# Patient Record
Sex: MALE | Race: WHITE | NOT HISPANIC OR LATINO | URBAN - METROPOLITAN AREA
[De-identification: names, ages, dates, MRNs, and addresses within clinical notes are randomized per-mention and may not be internally consistent; named-entity substitution may affect disease eponyms.]

---

## 2021-10-17 ENCOUNTER — EMERGENCY (EMERGENCY)
Facility: HOSPITAL | Age: 56
LOS: 1 days | Discharge: HOME | End: 2021-10-17
Attending: EMERGENCY MEDICINE | Admitting: EMERGENCY MEDICINE
Payer: COMMERCIAL

## 2021-10-17 VITALS
WEIGHT: 240.08 LBS | TEMPERATURE: 98 F | RESPIRATION RATE: 18 BRPM | DIASTOLIC BLOOD PRESSURE: 70 MMHG | HEART RATE: 69 BPM | SYSTOLIC BLOOD PRESSURE: 104 MMHG | OXYGEN SATURATION: 96 % | HEIGHT: 73 IN

## 2021-10-17 VITALS
TEMPERATURE: 98 F | SYSTOLIC BLOOD PRESSURE: 118 MMHG | RESPIRATION RATE: 18 BRPM | OXYGEN SATURATION: 96 % | DIASTOLIC BLOOD PRESSURE: 64 MMHG | HEART RATE: 77 BPM

## 2021-10-17 DIAGNOSIS — S60.811A ABRASION OF RIGHT WRIST, INITIAL ENCOUNTER: ICD-10-CM

## 2021-10-17 DIAGNOSIS — S60.812A ABRASION OF LEFT WRIST, INITIAL ENCOUNTER: ICD-10-CM

## 2021-10-17 DIAGNOSIS — S00.81XA ABRASION OF OTHER PART OF HEAD, INITIAL ENCOUNTER: ICD-10-CM
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
## 2021-10-17 DIAGNOSIS — S80.811A ABRASION, RIGHT LOWER LEG, INITIAL ENCOUNTER: ICD-10-CM
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
## 2021-10-17 DIAGNOSIS — S70.02XA CONTUSION OF LEFT HIP, INITIAL ENCOUNTER: ICD-10-CM

## 2021-10-17 DIAGNOSIS — Z23 ENCOUNTER FOR IMMUNIZATION: ICD-10-CM

## 2021-10-17 DIAGNOSIS — M25.552 PAIN IN LEFT HIP: ICD-10-CM

## 2021-10-17 DIAGNOSIS — R07.89 OTHER CHEST PAIN: ICD-10-CM

## 2021-10-17 LAB
ALBUMIN SERPL ELPH-MCNC: 4.6 G/DL — SIGNIFICANT CHANGE UP (ref 3.3–5)
ALP SERPL-CCNC: 58 U/L — SIGNIFICANT CHANGE UP (ref 40–120)
ALT FLD-CCNC: 44 U/L — SIGNIFICANT CHANGE UP (ref 10–45)
ANION GAP SERPL CALC-SCNC: 12 MMOL/L — SIGNIFICANT CHANGE UP (ref 5–17)
APTT BLD: 27.6 SEC — SIGNIFICANT CHANGE UP (ref 27.5–35.5)
AST SERPL-CCNC: 24 U/L — SIGNIFICANT CHANGE UP (ref 10–40)
BASOPHILS # BLD AUTO: 0.04 K/UL — SIGNIFICANT CHANGE UP (ref 0–0.2)
BASOPHILS NFR BLD AUTO: 0.3 % — SIGNIFICANT CHANGE UP (ref 0–2)
BILIRUB SERPL-MCNC: 0.3 MG/DL — SIGNIFICANT CHANGE UP (ref 0.2–1.2)
BUN SERPL-MCNC: 16 MG/DL — SIGNIFICANT CHANGE UP (ref 7–23)
CALCIUM SERPL-MCNC: 9.6 MG/DL — SIGNIFICANT CHANGE UP (ref 8.4–10.5)
CHLORIDE SERPL-SCNC: 105 MMOL/L — SIGNIFICANT CHANGE UP (ref 96–108)
CO2 SERPL-SCNC: 21 MMOL/L — LOW (ref 22–31)
CREAT SERPL-MCNC: 0.9 MG/DL — SIGNIFICANT CHANGE UP (ref 0.5–1.3)
EOSINOPHIL # BLD AUTO: 0.11 K/UL — SIGNIFICANT CHANGE UP (ref 0–0.5)
EOSINOPHIL NFR BLD AUTO: 0.8 % — SIGNIFICANT CHANGE UP (ref 0–6)
GLUCOSE SERPL-MCNC: 91 MG/DL — SIGNIFICANT CHANGE UP (ref 70–99)
HCT VFR BLD CALC: 42.6 % — SIGNIFICANT CHANGE UP (ref 39–50)
HGB BLD-MCNC: 14.9 G/DL — SIGNIFICANT CHANGE UP (ref 13–17)
IMM GRANULOCYTES NFR BLD AUTO: 0.3 % — SIGNIFICANT CHANGE UP (ref 0–1.5)
INR BLD: 1.09 — SIGNIFICANT CHANGE UP (ref 0.88–1.16)
LIDOCAIN IGE QN: 37 U/L — SIGNIFICANT CHANGE UP (ref 7–60)
LYMPHOCYTES # BLD AUTO: 14.8 % — SIGNIFICANT CHANGE UP (ref 13–44)
LYMPHOCYTES # BLD AUTO: 2.13 K/UL — SIGNIFICANT CHANGE UP (ref 1–3.3)
MCHC RBC-ENTMCNC: 32.2 PG — SIGNIFICANT CHANGE UP (ref 27–34)
MCHC RBC-ENTMCNC: 35 GM/DL — SIGNIFICANT CHANGE UP (ref 32–36)
MCV RBC AUTO: 92 FL — SIGNIFICANT CHANGE UP (ref 80–100)
MONOCYTES # BLD AUTO: 1.13 K/UL — HIGH (ref 0–0.9)
MONOCYTES NFR BLD AUTO: 7.8 % — SIGNIFICANT CHANGE UP (ref 2–14)
NEUTROPHILS # BLD AUTO: 10.98 K/UL — HIGH (ref 1.8–7.4)
NEUTROPHILS NFR BLD AUTO: 76 % — SIGNIFICANT CHANGE UP (ref 43–77)
NRBC # BLD: 0 /100 WBCS — SIGNIFICANT CHANGE UP (ref 0–0)
PLATELET # BLD AUTO: 318 K/UL — SIGNIFICANT CHANGE UP (ref 150–400)
POTASSIUM SERPL-MCNC: 3.9 MMOL/L — SIGNIFICANT CHANGE UP (ref 3.5–5.3)
POTASSIUM SERPL-SCNC: 3.9 MMOL/L — SIGNIFICANT CHANGE UP (ref 3.5–5.3)
PROT SERPL-MCNC: 7.5 G/DL — SIGNIFICANT CHANGE UP (ref 6–8.3)
PROTHROM AB SERPL-ACNC: 13 SEC — SIGNIFICANT CHANGE UP (ref 10.6–13.6)
RBC # BLD: 4.63 M/UL — SIGNIFICANT CHANGE UP (ref 4.2–5.8)
RBC # FLD: 12.7 % — SIGNIFICANT CHANGE UP (ref 10.3–14.5)
SODIUM SERPL-SCNC: 138 MMOL/L — SIGNIFICANT CHANGE UP (ref 135–145)
WBC # BLD: 14.44 K/UL — HIGH (ref 3.8–10.5)
WBC # FLD AUTO: 14.44 K/UL — HIGH (ref 3.8–10.5)

## 2021-10-17 PROCEDURE — 74177 CT ABD & PELVIS W/CONTRAST: CPT | Mod: 26,MA

## 2021-10-17 PROCEDURE — 71260 CT THORAX DX C+: CPT | Mod: MA

## 2021-10-17 PROCEDURE — 90715 TDAP VACCINE 7 YRS/> IM: CPT

## 2021-10-17 PROCEDURE — 80053 COMPREHEN METABOLIC PANEL: CPT

## 2021-10-17 PROCEDURE — 99284 EMERGENCY DEPT VISIT MOD MDM: CPT | Mod: 25

## 2021-10-17 PROCEDURE — 85025 COMPLETE CBC W/AUTO DIFF WBC: CPT

## 2021-10-17 PROCEDURE — 72125 CT NECK SPINE W/O DYE: CPT | Mod: MA

## 2021-10-17 PROCEDURE — 96374 THER/PROPH/DIAG INJ IV PUSH: CPT | Mod: XU

## 2021-10-17 PROCEDURE — 82962 GLUCOSE BLOOD TEST: CPT

## 2021-10-17 PROCEDURE — 85730 THROMBOPLASTIN TIME PARTIAL: CPT

## 2021-10-17 PROCEDURE — 70450 CT HEAD/BRAIN W/O DYE: CPT | Mod: MA

## 2021-10-17 PROCEDURE — 99285 EMERGENCY DEPT VISIT HI MDM: CPT | Mod: 25

## 2021-10-17 PROCEDURE — 74177 CT ABD & PELVIS W/CONTRAST: CPT | Mod: MA

## 2021-10-17 PROCEDURE — 83690 ASSAY OF LIPASE: CPT

## 2021-10-17 PROCEDURE — 72125 CT NECK SPINE W/O DYE: CPT | Mod: 26,MA

## 2021-10-17 PROCEDURE — 36415 COLL VENOUS BLD VENIPUNCTURE: CPT

## 2021-10-17 PROCEDURE — 73110 X-RAY EXAM OF WRIST: CPT

## 2021-10-17 PROCEDURE — 71260 CT THORAX DX C+: CPT | Mod: 26,MA

## 2021-10-17 PROCEDURE — 73562 X-RAY EXAM OF KNEE 3: CPT

## 2021-10-17 PROCEDURE — 73562 X-RAY EXAM OF KNEE 3: CPT | Mod: 26,RT

## 2021-10-17 PROCEDURE — 70450 CT HEAD/BRAIN W/O DYE: CPT | Mod: 26,MA

## 2021-10-17 PROCEDURE — 90471 IMMUNIZATION ADMIN: CPT

## 2021-10-17 PROCEDURE — 93005 ELECTROCARDIOGRAM TRACING: CPT

## 2021-10-17 PROCEDURE — 73110 X-RAY EXAM OF WRIST: CPT | Mod: 26,50

## 2021-10-17 PROCEDURE — 85610 PROTHROMBIN TIME: CPT

## 2021-10-17 RX ORDER — TETANUS TOXOID, REDUCED DIPHTHERIA TOXOID AND ACELLULAR PERTUSSIS VACCINE, ADSORBED 5; 2.5; 8; 8; 2.5 [IU]/.5ML; [IU]/.5ML; UG/.5ML; UG/.5ML; UG/.5ML
0.5 SUSPENSION INTRAMUSCULAR ONCE
Refills: 0 | Status: COMPLETED | OUTPATIENT
Start: 2021-10-17 | End: 2021-10-17

## 2021-10-17 RX ORDER — OXYCODONE AND ACETAMINOPHEN 5; 325 MG/1; MG/1
2 TABLET ORAL ONCE
Refills: 0 | Status: DISCONTINUED | OUTPATIENT
Start: 2021-10-17 | End: 2021-10-17

## 2021-10-17 RX ORDER — MORPHINE SULFATE 50 MG/1
4 CAPSULE, EXTENDED RELEASE ORAL ONCE
Refills: 0 | Status: DISCONTINUED | OUTPATIENT
Start: 2021-10-17 | End: 2021-10-17

## 2021-10-17 RX ADMIN — OXYCODONE AND ACETAMINOPHEN 2 TABLET(S): 5; 325 TABLET ORAL at 19:37

## 2021-10-17 RX ADMIN — MORPHINE SULFATE 4 MILLIGRAM(S): 50 CAPSULE, EXTENDED RELEASE ORAL at 17:47

## 2021-10-17 RX ADMIN — TETANUS TOXOID, REDUCED DIPHTHERIA TOXOID AND ACELLULAR PERTUSSIS VACCINE, ADSORBED 0.5 MILLILITER(S): 5; 2.5; 8; 8; 2.5 SUSPENSION INTRAMUSCULAR at 19:22

## 2021-10-17 RX ADMIN — MORPHINE SULFATE 4 MILLIGRAM(S): 50 CAPSULE, EXTENDED RELEASE ORAL at 18:31
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
## 2021-10-17 NOTE — ED PROVIDER NOTE - PATIENT PORTAL LINK FT
You can access the FollowMyHealth Patient Portal offered by [facility] by registering at the following website: [email or web address]. By joining [employer]’s FollowMyHealth portal, you will also be able to view your health information using other applications (apps) compatible with our system.

## 2021-10-17 NOTE — ED ADULT NURSE NOTE - NURSING NEURO ORIENTATION
GENERAL: NAD, lying in bed comfortably  HEAD:  Atraumatic, Normocephalic  EYES: EOMI, PERRLA, conjunctiva and sclera clear  ENT: Moist mucous membranes  NECK: Supple, No JVD  CHEST/LUNG: Clear to auscultation bilaterally; No rales, rhonchi, wheezing, or rubs. Unlabored respirations  HEART: Regular rate and rhythm; No murmurs, rubs, or gallops  ABDOMEN: BSx4; Soft, nontender, nondistended, no hepatospelnomegly  EXTREMITIES:  2+ Peripheral Pulses, brisk capillary refill. No clubbing, cyanosis, or edema; area over left palm with erythema and swelling but w/o pain to palpation  NERVOUS SYSTEM:  A&Ox3, no focal deficits   SKIN: diffuse patches of erythema on arms and legs with minimal purpura
oriented to person, place and time
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
## 2021-10-17 NOTE — ED PROVIDER NOTE - NSFOLLOWUPINSTRUCTIONS_ED_ALL_ED_FT
Please return for any worsening symptoms    Musculoskeletal Pain    WHAT YOU NEED TO KNOW:    Muscle pain can be dull, achy, or sharp. You may have pain and tenderness to the touch as well. It can occur anywhere on your body and is often brought on by exercise. Muscle pain may occur from an injury, such as a sprain, tendonitis, or bone fracture. Muscle pain can also be the result of medical conditions, such as polymyositis, fibromyalgia, and connective tissue disorders.     DISCHARGE INSTRUCTIONS:    Self care:     Rest as directed and avoid activity that causes pain. You may be able to return to normal activity when you can move without pain. Follow directions for rest and activity. You are at risk for injury for 3 weeks after your symptoms go away.       Ice your painful muscle area to decrease pain and swelling. Use an ice pack, or put ice in a plastic bag and cover it with a towel. Always put a cloth between the ice and your skin. Apply the ice as often as directed for the first 24 to 48 hours.       Compression with a splint, brace, or elastic bandage helps decrease pain and swelling. This may be needed for muscle pain in arms or legs. A splint, brace, or bandage will also help protect the painful area when you move around.       Elevate a painful arm or leg to reduce swelling and pain. Elevate your limb while you are sitting or lying down. Prop a painful leg on pillows to keep it above the level of your heart.    Medicines:     NSAIDs help decrease swelling and pain or fever. This medicine is available with or without a doctor's order. NSAIDs can cause stomach bleeding or kidney problems in certain people. If you take blood thinner medicine, always ask your healthcare provider if NSAIDs are safe for you. Always read the medicine label and follow directions.      Acetaminophen is used to decrease pain. It is available without a doctor's order. Ask your healthcare provider how much to take and when to take it. Follow directions. Acetaminophen can cause liver damage if not taken correctly. Do not take more than one medicine that contains acetaminophen unless directed.       Muscle relaxers help relax your muscles to decrease pain and muscle spasms.       Steroids may be given to decrease redness, pain, and swelling.      Take your medicine as directed. Contact your healthcare provider if you think your medicine is not helping or if you have side effects. Tell him if you are allergic to any medicine. Keep a list of the medicines, vitamins, and herbs you take. Include the amounts, and when and why you take them. Bring the list or the pill bottles to follow-up visits. Carry your medicine list with you in case of an emergency.    Follow up with your healthcare provider as directed: You may need more tests to help healthcare providers find the cause of your muscle pain. You may need physical therapy to learn muscle strengthening exercises. Write down your questions so you remember to ask them during your visits.     Contact your healthcare provider if:     You have a fever.       You have trouble sleeping because of your pain.       Your painful area becomes more tender, red, and warm to the touch.       You have decreased movement of the painful area.       You have questions or concerns about your condition or care.    Return to the emergency department if:     You have increased severe pain when you move the painful muscle area.       You lose feeling in your painful muscle area.       You have new or worse swelling in the painful area. Your skin may feel tight.       You have increased muscle pain or pain that does not improve with treatment.
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
## 2023-11-07 NOTE — ED ADULT TRIAGE NOTE - NS ED NURSE BANDS TYPE
DATE OF VISIT: 11/8/2023    REASON FOR VISIT: Followup for locally advanced squamous cell carcinoma of the left ear     PROBLEM LIST:  1. 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  Presented with gradually growing mass over the left ear for the last 3 years.  B.  Patient did not seek any medical attention until April 2023.  C.  CT neck and chest done at  April 2023 confirmed locally advanced lesion eroding into the left mastoid with left cervical lymphadenopathy but no evidence of chest metastases.  D.  Biopsy done April 2023 confirmed squamous cell carcinoma.  E.  Status post palliative radiation completed April 28, 2023  F.  Started immunotherapy with Libtayo 350 mg IV every 3 weeks May 22, 2023.  Status post 8 cycles.  2.  Cancer-related pain  3.  Mild depression  4.  BPH    HISTORY OF PRESENT ILLNESS: The patient is a very pleasant 80 y.o. male  with past medical history significant for locally advanced left ear squamous cell carcinoma diagnosed April 2023.  The patient was started on Libtayo May 16, 2023. The patient is here today for scheduled follow-up visit with treatment cycle #9.    SUBJECTIVE: The patient is here today by himself.  All in all he is doing well.  His discharge from above the left ear has significantly slowed down.    Past History:  Medical History: has a past medical history of Anemia, Arthritis, and Cancer.   Surgical History: has a past surgical history that includes Skin cancer excision; Neck dissection (N/A); Parotidectomy (N/A); and NAIL BIOPSY (N/A).   Family History: family history includes Cancer in his father and mother.   Social History: reports that he has never smoked. His smokeless tobacco use includes chew. He reports that he does not currently use alcohol. He reports that he does not use drugs.    (Not in a hospital admission)     Allergies: Patient has no known allergies.     Review of Systems   Constitutional:  Positive for fatigue.   HENT:  " Positive for hearing loss.    Respiratory: Negative.     Gastrointestinal: Negative.    Skin:  Positive for wound.   Neurological: Negative.    Psychiatric/Behavioral: Negative.         PHYSICAL EXAMINATION:   /68   Pulse 100   Temp 97.5 °F (36.4 °C) (Infrared)   Resp 18   Ht 182.9 cm (72.01\")   Wt 96.6 kg (213 lb)   SpO2 99%   BMI 28.88 kg/m²    Pain Score    11/08/23 0918   PainSc: 0-No pain                           ECOG Performance Status: 1 - Symptomatic but completely ambulatory      General Appearance:      alert, cooperative, no apparent distress and appears stated age   Lungs:   Clear to auscultation bilaterally; respirations regular, even, and unlabored bilaterally   Heart:  Regular rate and rhythm, no murmurs appreciated   Abdomen:   Soft, non-tender, and non-distended                 No visits with results within 2 Week(s) from this visit.   Latest known visit with results is:   Infusion on 10/18/2023   Component Date Value Ref Range Status    Glucose 10/18/2023 93  65 - 99 mg/dL Final    BUN 10/18/2023 14  8 - 23 mg/dL Final    Creatinine 10/18/2023 1.65 (H)  0.76 - 1.27 mg/dL Final    Sodium 10/18/2023 138  136 - 145 mmol/L Final    Potassium 10/18/2023 4.5  3.5 - 5.2 mmol/L Final    Chloride 10/18/2023 102  98 - 107 mmol/L Final    CO2 10/18/2023 24.6  22.0 - 29.0 mmol/L Final    Calcium 10/18/2023 9.9  8.6 - 10.5 mg/dL Final    Total Protein 10/18/2023 7.1  6.0 - 8.5 g/dL Final    Albumin 10/18/2023 4.4  3.5 - 5.2 g/dL Final    ALT (SGPT) 10/18/2023 15  1 - 41 U/L Final    AST (SGOT) 10/18/2023 28  1 - 40 U/L Final    Alkaline Phosphatase 10/18/2023 45  39 - 117 U/L Final    Total Bilirubin 10/18/2023 1.2  0.0 - 1.2 mg/dL Final    Globulin 10/18/2023 2.7  gm/dL Final    A/G Ratio 10/18/2023 1.6  g/dL Final    BUN/Creatinine Ratio 10/18/2023 8.5  7.0 - 25.0 Final    Anion Gap 10/18/2023 11.4  5.0 - 15.0 mmol/L Final    eGFR 10/18/2023 42.0 (L)  >60.0 mL/min/1.73 Final    WBC 10/18/2023 " 3.62  3.40 - 10.80 10*3/mm3 Final    RBC 10/18/2023 3.85 (L)  4.14 - 5.80 10*6/mm3 Final    Hemoglobin 10/18/2023 11.7 (L)  13.0 - 17.7 g/dL Final    Hematocrit 10/18/2023 35.4 (L)  37.5 - 51.0 % Final    MCV 10/18/2023 91.9  79.0 - 97.0 fL Final    MCH 10/18/2023 30.4  26.6 - 33.0 pg Final    MCHC 10/18/2023 33.1  31.5 - 35.7 g/dL Final    RDW 10/18/2023 14.0  12.3 - 15.4 % Final    RDW-SD 10/18/2023 47.5  37.0 - 54.0 fl Final    MPV 10/18/2023 9.9  6.0 - 12.0 fL Final    Platelets 10/18/2023 170  140 - 450 10*3/mm3 Final    Neutrophil % 10/18/2023 50.3  42.7 - 76.0 % Final    Lymphocyte % 10/18/2023 34.3  19.6 - 45.3 % Final    Monocyte % 10/18/2023 10.5  5.0 - 12.0 % Final    Eosinophil % 10/18/2023 3.3  0.3 - 6.2 % Final    Basophil % 10/18/2023 0.8  0.0 - 1.5 % Final    Immature Grans % 10/18/2023 0.8 (H)  0.0 - 0.5 % Final    Neutrophils, Absolute 10/18/2023 1.82  1.70 - 7.00 10*3/mm3 Final    Lymphocytes, Absolute 10/18/2023 1.24  0.70 - 3.10 10*3/mm3 Final    Monocytes, Absolute 10/18/2023 0.38  0.10 - 0.90 10*3/mm3 Final    Eosinophils, Absolute 10/18/2023 0.12  0.00 - 0.40 10*3/mm3 Final    Basophils, Absolute 10/18/2023 0.03  0.00 - 0.20 10*3/mm3 Final    Immature Grans, Absolute 10/18/2023 0.03  0.00 - 0.05 10*3/mm3 Final    nRBC 10/18/2023 0.0  0.0 - 0.2 /100 WBC Final        No results found.  05/23/23 08/01/2023      ASSESSMENT: The patient is a very pleasant 80 y.o. male  with locally advanced left ear squamous cell carcinoma      PLAN:    1.  Locally advanced squamous cell carcinoma of the left ear T4 N1 M0 stage IV:  A.  I will proceed with treatment as scheduled today Libtayo IV every 3 weeks cycle #9.  B.  The patient will follow-up with us in 3 weeks for cycle #10.  C.  I will continue to monitor the patient blood work including blood counts kidney function liver function and electrolytes.  D.  I will continue to hold off on doing imaging.  We will follow patient clinically for response.     E.  I explained to the patient the goal of treatment is palliative given his locally advanced disease with ipsilateral lymph node metastases.  F. We reviewed potential side effects of immunotherapy including but not limited to immune mediated reactions with thyroiditis, pneumonitis, hepatitis, colitis, rash, and electrolyte abnormalities, fatigue, multiorgan failure, and possibly death.  G.  I did go over the blood work results with the patient from October 18, 2023.  His creatinine did increase to 1.65.  Will monitor closely.  We will consider stopping treatment with some steroids if it increases above 2.    2. Cancer-related pain:  A.  He will continue oxycodone as needed.     3.  Wound care:  A.  I reviewed with the patient and the family this is extremely important to assure wound cleanness.  B.  We will continue wound care with home health.  C.  We requested imaging from home [facility] for updated picture this month.     4.  Depression:  A.  He will continue Cymbalta daily    5.  Treatment related itching  A.  I we will continue hydroxyzine.  We will try to increase his dose to 50 mg to see if this helps with itching.  I did advise the patient and his son to be careful with sedation and if this occurs to stop.  B.  I asked the patient to make sure he is keeping his skin hydrated with non perfumed lotions as well.    6. Treatment related hypothyroidism  A. Continue Synthroid 50mcg.   B. I will refill today    FOLLOW UP: 3 weeks with cycle #10.      Benitez Medellin MD  11/8/2023   Name band;